# Patient Record
Sex: FEMALE | Race: WHITE | Employment: FULL TIME
[De-identification: names, ages, dates, MRNs, and addresses within clinical notes are randomized per-mention and may not be internally consistent; named-entity substitution may affect disease eponyms.]

---

## 2023-01-13 ENCOUNTER — NURSE TRIAGE (OUTPATIENT)
Dept: OTHER | Facility: CLINIC | Age: 33
End: 2023-01-13

## 2023-01-13 NOTE — TELEPHONE ENCOUNTER
Location of patient: [address] [place] [address]     Received call from Gracia at [facility] with [employer]. Pt would like to establish care with Iliana IRAHETA    Subjective: Caller states has Migraine. Pt states she has a hx of Migraine. Feels eyes are burning. Current Symptoms: Migraine    Onset: 2 weeks ago; gradual    Associated Symptoms:  na    Pain Severity: 7/10; throbbing, pressure; intermittent    Temperature: Denies     What has been tried: Tylenol with some relief at times    LMP:  01/11/23  Pregnant: No    Recommended disposition: See in Office Today if unable to establish appt today pt should go to [facility] [address] advice provided, patient verbalizes understanding; denies any other questions or concerns; instructed to call back for any new or worsening symptoms. Patient/Caller agrees with recommended disposition; writer provided warm transfer to [facility] for appointment scheduling    Attention Provider: Thank you for allowing me to participate in the care of your patient. The patient was connected to triage in response to information provided to the [facility]. Please do not respond through this encounter as the response is not directed to a shared pool.           Reason for Disposition   Patient wants to be seen    Protocols used: Headache-ADULT-[place]